# Patient Record
Sex: FEMALE | Race: BLACK OR AFRICAN AMERICAN | NOT HISPANIC OR LATINO | Employment: OTHER | URBAN - METROPOLITAN AREA
[De-identification: names, ages, dates, MRNs, and addresses within clinical notes are randomized per-mention and may not be internally consistent; named-entity substitution may affect disease eponyms.]

---

## 2022-04-11 ENCOUNTER — HISTORICAL (OUTPATIENT)
Dept: ADMINISTRATIVE | Facility: HOSPITAL | Age: 82
End: 2022-04-11

## 2022-04-28 VITALS
DIASTOLIC BLOOD PRESSURE: 85 MMHG | OXYGEN SATURATION: 98 % | HEIGHT: 61 IN | WEIGHT: 134.5 LBS | BODY MASS INDEX: 25.39 KG/M2 | SYSTOLIC BLOOD PRESSURE: 132 MMHG

## 2022-12-23 ENCOUNTER — HOSPITAL ENCOUNTER (OUTPATIENT)
Facility: HOSPITAL | Age: 82
Discharge: HOME | End: 2022-12-23
Attending: INTERNAL MEDICINE | Admitting: INTERNAL MEDICINE
Payer: MEDICARE

## 2022-12-23 ENCOUNTER — ANESTHESIA (OUTPATIENT)
Dept: ENDOSCOPY | Facility: HOSPITAL | Age: 82
End: 2022-12-23
Payer: MEDICARE

## 2022-12-23 ENCOUNTER — ANESTHESIA EVENT (OUTPATIENT)
Dept: ENDOSCOPY | Facility: HOSPITAL | Age: 82
End: 2022-12-23
Payer: MEDICARE

## 2022-12-23 VITALS
BODY MASS INDEX: 27.19 KG/M2 | DIASTOLIC BLOOD PRESSURE: 60 MMHG | RESPIRATION RATE: 12 BRPM | OXYGEN SATURATION: 99 % | TEMPERATURE: 97 F | SYSTOLIC BLOOD PRESSURE: 128 MMHG | HEIGHT: 61 IN | HEART RATE: 57 BPM | WEIGHT: 144 LBS

## 2022-12-23 DIAGNOSIS — K92.1 MELENA: ICD-10-CM

## 2022-12-23 PROCEDURE — 25000003 PHARM REV CODE 250: Performed by: NURSE ANESTHETIST, CERTIFIED REGISTERED

## 2022-12-23 PROCEDURE — 27201423 OPTIME MED/SURG SUP & DEVICES STERILE SUPPLY: Performed by: INTERNAL MEDICINE

## 2022-12-23 PROCEDURE — 63600175 PHARM REV CODE 636 W HCPCS: Performed by: NURSE ANESTHETIST, CERTIFIED REGISTERED

## 2022-12-23 PROCEDURE — 45385 COLONOSCOPY W/LESION REMOVAL: CPT | Mod: PT | Performed by: INTERNAL MEDICINE

## 2022-12-23 PROCEDURE — 37000008 HC ANESTHESIA 1ST 15 MINUTES: Performed by: INTERNAL MEDICINE

## 2022-12-23 PROCEDURE — 88305 TISSUE EXAM BY PATHOLOGIST: CPT | Performed by: INTERNAL MEDICINE

## 2022-12-23 PROCEDURE — 37000009 HC ANESTHESIA EA ADD 15 MINS: Performed by: INTERNAL MEDICINE

## 2022-12-23 RX ORDER — SODIUM CHLORIDE, SODIUM GLUCONATE, SODIUM ACETATE, POTASSIUM CHLORIDE AND MAGNESIUM CHLORIDE 30; 37; 368; 526; 502 MG/100ML; MG/100ML; MG/100ML; MG/100ML; MG/100ML
INJECTION, SOLUTION INTRAVENOUS CONTINUOUS
Status: DISCONTINUED | OUTPATIENT
Start: 2022-12-23 | End: 2022-12-23 | Stop reason: HOSPADM

## 2022-12-23 RX ORDER — AMLODIPINE BESYLATE 10 MG/1
10 TABLET ORAL DAILY
COMMUNITY

## 2022-12-23 RX ORDER — PROPOFOL 10 MG/ML
VIAL (ML) INTRAVENOUS
Status: DISPENSED
Start: 2022-12-23 | End: 2022-12-23

## 2022-12-23 RX ORDER — LIDOCAINE HYDROCHLORIDE 20 MG/ML
INJECTION, SOLUTION EPIDURAL; INFILTRATION; INTRACAUDAL; PERINEURAL
Status: DISPENSED
Start: 2022-12-23 | End: 2022-12-23

## 2022-12-23 RX ORDER — DIPHENHYDRAMINE HYDROCHLORIDE 50 MG/ML
25 INJECTION INTRAMUSCULAR; INTRAVENOUS EVERY 6 HOURS PRN
Status: CANCELLED | OUTPATIENT
Start: 2022-12-23

## 2022-12-23 RX ORDER — ALENDRONATE SODIUM 70 MG/1
70 TABLET ORAL
COMMUNITY

## 2022-12-23 RX ORDER — LEVOTHYROXINE SODIUM 25 UG/1
25 TABLET ORAL
COMMUNITY

## 2022-12-23 RX ORDER — CLONIDINE HYDROCHLORIDE 0.2 MG/1
0.2 TABLET ORAL DAILY
COMMUNITY

## 2022-12-23 RX ORDER — NEBIVOLOL 10 MG/1
10 TABLET ORAL DAILY
COMMUNITY

## 2022-12-23 RX ORDER — LIDOCAINE HYDROCHLORIDE 10 MG/ML
1 INJECTION, SOLUTION EPIDURAL; INFILTRATION; INTRACAUDAL; PERINEURAL ONCE
Status: DISCONTINUED | OUTPATIENT
Start: 2022-12-23 | End: 2022-12-23 | Stop reason: HOSPADM

## 2022-12-23 RX ORDER — LIDOCAINE HYDROCHLORIDE 20 MG/ML
INJECTION, SOLUTION EPIDURAL; INFILTRATION; INTRACAUDAL; PERINEURAL
Status: DISCONTINUED | OUTPATIENT
Start: 2022-12-23 | End: 2022-12-23

## 2022-12-23 RX ORDER — HYDROMORPHONE HYDROCHLORIDE 2 MG/ML
0.2 INJECTION, SOLUTION INTRAMUSCULAR; INTRAVENOUS; SUBCUTANEOUS EVERY 5 MIN PRN
Status: CANCELLED | OUTPATIENT
Start: 2022-12-23

## 2022-12-23 RX ORDER — ONDANSETRON 2 MG/ML
4 INJECTION INTRAMUSCULAR; INTRAVENOUS DAILY PRN
Status: CANCELLED | OUTPATIENT
Start: 2022-12-23

## 2022-12-23 RX ORDER — PROPOFOL 10 MG/ML
VIAL (ML) INTRAVENOUS CONTINUOUS PRN
Status: DISCONTINUED | OUTPATIENT
Start: 2022-12-23 | End: 2022-12-23

## 2022-12-23 RX ADMIN — LIDOCAINE HYDROCHLORIDE 50 MG: 20 INJECTION, SOLUTION INTRAVENOUS at 09:12

## 2022-12-23 RX ADMIN — SODIUM CHLORIDE, SODIUM GLUCONATE, SODIUM ACETATE, POTASSIUM CHLORIDE AND MAGNESIUM CHLORIDE: 526; 502; 368; 37; 30 INJECTION, SOLUTION INTRAVENOUS at 09:12

## 2022-12-23 RX ADMIN — PROPOFOL 120 MCG/KG/MIN: 10 INJECTION, EMULSION INTRAVENOUS at 09:12

## 2022-12-23 NOTE — ANESTHESIA PREPROCEDURE EVALUATION
12/23/2022  Deloris Rodriguez is a 82 y.o. female with melena for colonoscopy.  She has a hx of HTN and hypothyroid, but is generally quite healthy and active for her age.  She is capable of 4 mets.  She is pleasant, calm, conversant, and appropriate in holding area.        Pre-op Assessment    I have reviewed the Patient Summary Reports.     I have reviewed the Nursing Notes. I have reviewed the NPO Status.   I have reviewed the Medications.     Review of Systems  Anesthesia Hx:  No problems with previous Anesthesia  Denies Family Hx of Anesthesia complications.   Denies Personal Hx of Anesthesia complications.   Cardiovascular:   Exercise tolerance: good Hypertension    Pulmonary:  Pulmonary Normal    Endocrine:   Hypothyroidism        Physical Exam  General: Well nourished, Cooperative, Alert and Oriented    Airway:  Mallampati: II   Mouth Opening: Normal  TM Distance: Normal  Tongue: Normal  Neck ROM: Normal ROM    Chest/Lungs:  Clear to auscultation, Normal Respiratory Rate    Heart:  Rate: Normal  Rhythm: Regular Rhythm        Anesthesia Plan  Type of Anesthesia, risks & benefits discussed:    Anesthesia Type: Gen Natural Airway  Intra-op Monitoring Plan: Standard ASA Monitors  Post Op Pain Control Plan: multimodal analgesia  Induction:  IV  Informed Consent: Informed consent signed with the Patient and all parties understand the risks and agree with anesthesia plan.  All questions answered.   ASA Score: 2  Day of Surgery Review of History & Physical: H&P Update referred to the surgeon/provider.    Ready For Surgery From Anesthesia Perspective.     .

## 2022-12-23 NOTE — TRANSFER OF CARE
"Anesthesia Transfer of Care Note    Patient: Deloris Rodriguez    Procedure(s) Performed: Procedure(s) (LRB):  COLON (N/A)  COLONOSCOPY, WITH POLYPECTOMY USING SNARE    Patient location: GI    Anesthesia Type: MAC    Transport from OR: Transported from OR on room air with adequate spontaneous ventilation    Post pain: adequate analgesia    Post assessment: no apparent anesthetic complications    Post vital signs: stable    Level of consciousness: awake    Complications: none    Transfer of care protocol was followed      Last vitals:   Visit Vitals  /72   Pulse (!) 58   Temp 36.3 °C (97.3 °F)   Resp 19   Ht 5' 1" (1.549 m)   Wt 65.3 kg (144 lb)   SpO2 100%   BMI 27.21 kg/m²     "

## 2022-12-23 NOTE — PROVATION PATIENT INSTRUCTIONS
Discharge Summary/Instructions after an Endoscopic Procedure  Patient Name: Deloris Rodriguez  Patient MRN: [id number]  Patient YOB: 1940  Friday, December 23, 2022  Wicho Norris MD  Dear patient,  As a result of recent federal legislation (The Federal Cures Act), you may   receive lab or pathology results from your procedure in your [facility]   account before your physician is able to contact you. Your physician or   their representative will relay the results to you with their   recommendations at their soonest availability.  Thank you,  RESTRICTIONS:  During your procedure today, you received medications for sedation.  These   medications may affect your judgment, balance and coordination.  Therefore,   for 24 hours, you have the following restrictions:   - DO NOT drive a car, operate machinery, make legal/financial decisions,   sign important papers or drink alcohol.    ACTIVITY:  Today: no heavy lifting, straining or running due to procedural   sedation/anesthesia.  The following day: return to full activity including work.  DIET:  Eat and drink normally unless instructed otherwise.     TREATMENT FOR COMMON SIDE EFFECTS:  - Mild abdominal pain, nausea, belching, bloating or excessive gas:  rest,   eat lightly and use a heating pad.  - Sore Throat: treat with throat lozenges and/or gargle with warm salt   water.  - Because air was used during the procedure, expelling large amounts of air   from your rectum or belching is normal.  - If a bowel prep was taken, you may not have a bowel movement for 1-3 days.    This is normal.  SYMPTOMS TO WATCH FOR AND REPORT TO YOUR PHYSICIAN:  1. Abdominal pain or bloating, other than gas cramps.  2. Chest pain.  3. Back pain.  4. Signs of infection such as: chills or fever occurring within 24 hours   after the procedure.  5. Rectal bleeding, which would show as bright red, maroon, or black stools.   (A tablespoon of blood from the rectum is not serious,  especially if   hemorrhoids are present.)  6. Vomiting.  7. Weakness or dizziness.  GO DIRECTLY TO THE NEAREST EMERGENCY ROOM IF YOU HAVE ANY OF THE FOLLOWING:      Difficulty breathing              Chills and/or fever over 101 F   Persistent vomiting and/or vomiting blood   Severe abdominal pain   Severe chest pain   Black, tarry stools   Bleeding- more than one tablespoon   Any other symptom or condition that you feel may need urgent attention  Your doctor recommends these additional instructions:  If any biopsies were taken, your doctors clinic will contact you in 1 to 2   weeks with any results.  - Discharge patient to home.   - Resume previous diet.   - Continue present medications.   - Await pathology results.   - Repeat colonoscopy is not recommended due to current age (80 years or   older) not required according to current guidelines and current age.   - Return to GI office PRN.  For questions, problems or results please call your physician - Wicho Norris MD at Work:  [phone].  [facility], EMERGENCY ROOM PHONE NUMBER: [phone]  IF A COMPLICATION OR EMERGENCY SITUATION ARISES AND YOU ARE UNABLE TO REACH   YOUR PHYSICIAN - GO DIRECTLY TO THE EMERGENCY ROOM.  MD Wicho Leung MD  12/23/2022 9:39:28 AM  This report has been verified and signed electronically.  Dear patient,  As a result of recent federal legislation (The Federal Cures Act), you may   receive lab or pathology results from your procedure in your [facility]   account before your physician is able to contact you. Your physician or   their representative will relay the results to you with their   recommendations at their soonest availability.  Thank you,  PROVATION

## 2022-12-27 LAB
ESTROGEN SERPL-MCNC: NORMAL PG/ML
INSULIN SERPL-ACNC: NORMAL U[IU]/ML
LAB AP CLINICAL INFORMATION: NORMAL
LAB AP GROSS DESCRIPTION: NORMAL
LAB AP REPORT FOOTNOTES: NORMAL
T3RU NFR SERPL: NORMAL %

## 2022-12-28 NOTE — ANESTHESIA POSTPROCEDURE EVALUATION
Anesthesia Post Evaluation    Patient: Deloris Rodriguez    Procedure(s) Performed: Procedure(s) (LRB):  COLON (N/A)  COLONOSCOPY, WITH POLYPECTOMY USING SNARE    Final Anesthesia Type: general      Patient location during evaluation: PACU  Patient participation: Yes- Able to Participate  Level of consciousness: awake and alert  Post-procedure vital signs: reviewed and stable  Pain management: adequate  Airway patency: patent      Anesthetic complications: no      Cardiovascular status: blood pressure returned to baseline  Respiratory status: unassisted  Hydration status: euvolemic  Follow-up not needed.          Vitals Value Taken Time   /60 12/23/22 1001   Temp 36.1 °C (97 °F) 12/23/22 0940   Pulse 57 12/23/22 1001   Resp 12 12/23/22 1001   SpO2 99 % 12/23/22 1001         No case tracking events are documented in the log.      Pain/Malathi Score: No data recorded

## 2023-03-30 NOTE — H&P
History and Physical           HPI:     Patient is a 82 y.o. female presents for EGD to evaluate melena    PCP:  Roger Du Jr, MD    Review of patient's allergies indicates:  No Known Allergies     Past Medical History:  Past Medical History:   Diagnosis Date    Hypertension     Thyroid disease       Past Surgical History:  Past Surgical History:   Procedure Laterality Date    COLONOSCOPY N/A 12/23/2022    Procedure: COLON;  Surgeon: Wicho Norris MD;  Location: [facility] ENDOSCOPY;  Service: Gastroenterology;  Laterality: N/A;    COLONOSCOPY, WITH POLYPECTOMY USING SNARE  12/23/2022    Procedure: COLONOSCOPY, WITH POLYPECTOMY USING SNARE;  Surgeon: Wicho Norris MD;  Location: [facility] ENDOSCOPY;  Service: Gastroenterology;;      Family History:  History reviewed. No pertinent family history.  Social History:  Social History     Tobacco Use    Smoking status: Not on file    Smokeless tobacco: Not on file   Substance Use Topics    Alcohol use: Not on file         Review of Systems:     Review of Systems    Objective:     VITAL SIGNS: 24 HR MIN & MAX LAST    No data recorded  97 °F (36.1 °C)        No data recorded  128/60     No data recorded  (!) 57     No data recorded  12    No data recorded  99 %      Physical Exam      No results found for this or any previous visit (from the past 48 hour(s)).    No results found.    @[facility]@    Assessment /Plan:   Melena-proceed with EGD to rule out upper GI bleeding  There are no problems to display for this patient.       Thank you for allowing us to participate in this patient's care.

## 2024-07-23 DIAGNOSIS — R93.2 NONVISUALIZATION OF GALLBLADDER: Primary | ICD-10-CM

## 2024-11-12 ENCOUNTER — ANESTHESIA (OUTPATIENT)
Dept: ENDOSCOPY | Facility: HOSPITAL | Age: 84
End: 2024-11-12
Payer: MEDICARE

## 2024-11-12 ENCOUNTER — HOSPITAL ENCOUNTER (OUTPATIENT)
Facility: HOSPITAL | Age: 84
Discharge: HOME | End: 2024-11-12
Attending: INTERNAL MEDICINE | Admitting: INTERNAL MEDICINE
Payer: MEDICARE

## 2024-11-12 ENCOUNTER — ANESTHESIA EVENT (OUTPATIENT)
Dept: ENDOSCOPY | Facility: HOSPITAL | Age: 84
End: 2024-11-12
Payer: MEDICARE

## 2024-11-12 DIAGNOSIS — R10.13 ABDOMINAL PAIN, EPIGASTRIC: ICD-10-CM

## 2024-11-12 DIAGNOSIS — R10.11 RUQ PAIN: ICD-10-CM

## 2024-11-12 PROCEDURE — 37000008 HC ANESTHESIA 1ST 15 MINUTES: Performed by: INTERNAL MEDICINE

## 2024-11-12 PROCEDURE — 37000009 HC ANESTHESIA EA ADD 15 MINS: Performed by: INTERNAL MEDICINE

## 2024-11-12 PROCEDURE — 88313 SPECIAL STAINS GROUP 2: CPT

## 2024-11-12 PROCEDURE — 43239 EGD BIOPSY SINGLE/MULTIPLE: CPT | Performed by: INTERNAL MEDICINE

## 2024-11-12 PROCEDURE — 63600175 PHARM REV CODE 636 W HCPCS: Performed by: NURSE ANESTHETIST, CERTIFIED REGISTERED

## 2024-11-12 PROCEDURE — 88305 TISSUE EXAM BY PATHOLOGIST: CPT | Performed by: INTERNAL MEDICINE

## 2024-11-12 PROCEDURE — 25000003 PHARM REV CODE 250: Performed by: NURSE ANESTHETIST, CERTIFIED REGISTERED

## 2024-11-12 PROCEDURE — 88342 IMHCHEM/IMCYTCHM 1ST ANTB: CPT

## 2024-11-12 RX ORDER — LIDOCAINE HYDROCHLORIDE 20 MG/ML
INJECTION, SOLUTION EPIDURAL; INFILTRATION; INTRACAUDAL; PERINEURAL
Status: DISCONTINUED
Start: 2024-11-12 | End: 2024-11-12 | Stop reason: HOSPADM

## 2024-11-12 RX ORDER — ONDANSETRON HYDROCHLORIDE 2 MG/ML
INJECTION, SOLUTION INTRAVENOUS
Status: COMPLETED
Start: 2024-11-12 | End: 2024-11-12

## 2024-11-12 RX ORDER — DIPHENHYDRAMINE HYDROCHLORIDE 50 MG/ML
25 INJECTION INTRAMUSCULAR; INTRAVENOUS EVERY 6 HOURS PRN
OUTPATIENT
Start: 2024-11-12

## 2024-11-12 RX ORDER — DEXAMETHASONE SODIUM PHOSPHATE 4 MG/ML
INJECTION, SOLUTION INTRA-ARTICULAR; INTRALESIONAL; INTRAMUSCULAR; INTRAVENOUS; SOFT TISSUE
Status: COMPLETED
Start: 2024-11-12 | End: 2024-11-12

## 2024-11-12 RX ORDER — LIDOCAINE HYDROCHLORIDE 20 MG/ML
INJECTION INTRAVENOUS
Status: DISCONTINUED | OUTPATIENT
Start: 2024-11-12 | End: 2024-11-12

## 2024-11-12 RX ORDER — DEXAMETHASONE SODIUM PHOSPHATE 4 MG/ML
INJECTION, SOLUTION INTRA-ARTICULAR; INTRALESIONAL; INTRAMUSCULAR; INTRAVENOUS; SOFT TISSUE
Status: DISCONTINUED | OUTPATIENT
Start: 2024-11-12 | End: 2024-11-12

## 2024-11-12 RX ORDER — ONDANSETRON HYDROCHLORIDE 2 MG/ML
INJECTION, SOLUTION INTRAVENOUS
Status: DISCONTINUED | OUTPATIENT
Start: 2024-11-12 | End: 2024-11-12

## 2024-11-12 RX ORDER — PROPOFOL 10 MG/ML
VIAL (ML) INTRAVENOUS
Status: COMPLETED
Start: 2024-11-12 | End: 2024-11-12

## 2024-11-12 RX ORDER — HYDROMORPHONE HYDROCHLORIDE 2 MG/ML
0.2 INJECTION, SOLUTION INTRAMUSCULAR; INTRAVENOUS; SUBCUTANEOUS EVERY 5 MIN PRN
OUTPATIENT
Start: 2024-11-12

## 2024-11-12 RX ORDER — SODIUM CHLORIDE, SODIUM GLUCONATE, SODIUM ACETATE, POTASSIUM CHLORIDE AND MAGNESIUM CHLORIDE 30; 37; 368; 526; 502 MG/100ML; MG/100ML; MG/100ML; MG/100ML; MG/100ML
INJECTION, SOLUTION INTRAVENOUS CONTINUOUS
Status: DISCONTINUED | OUTPATIENT
Start: 2024-11-12 | End: 2024-11-12 | Stop reason: HOSPADM

## 2024-11-12 RX ORDER — ONDANSETRON HYDROCHLORIDE 2 MG/ML
4 INJECTION, SOLUTION INTRAVENOUS DAILY PRN
OUTPATIENT
Start: 2024-11-12

## 2024-11-12 RX ORDER — GLUCAGON 1 MG
1 KIT INJECTION
OUTPATIENT
Start: 2024-11-12

## 2024-11-12 RX ORDER — LIDOCAINE HYDROCHLORIDE 10 MG/ML
1 INJECTION, SOLUTION EPIDURAL; INFILTRATION; INTRACAUDAL; PERINEURAL ONCE
Status: DISCONTINUED | OUTPATIENT
Start: 2024-11-12 | End: 2024-11-12 | Stop reason: HOSPADM

## 2024-11-12 RX ORDER — PROPOFOL 10 MG/ML
INJECTION, EMULSION INTRAVENOUS
Status: DISCONTINUED | OUTPATIENT
Start: 2024-11-12 | End: 2024-11-12

## 2024-11-12 RX ADMIN — SODIUM CHLORIDE: 9 INJECTION, SOLUTION INTRAVENOUS at 09:11

## 2024-11-12 RX ADMIN — DEXAMETHASONE SODIUM PHOSPHATE 4 MG: 4 INJECTION, SOLUTION INTRA-ARTICULAR; INTRALESIONAL; INTRAMUSCULAR; INTRAVENOUS; SOFT TISSUE at 09:11

## 2024-11-12 RX ADMIN — PROPOFOL 70 MG: 10 INJECTION, EMULSION INTRAVENOUS at 09:11

## 2024-11-12 RX ADMIN — ONDANSETRON 4 MG: 2 INJECTION INTRAMUSCULAR; INTRAVENOUS at 09:11

## 2024-11-12 RX ADMIN — LIDOCAINE HYDROCHLORIDE 60 MG: 20 INJECTION INTRAVENOUS at 09:11

## 2024-11-12 RX ADMIN — PROPOFOL 60 MG: 10 INJECTION, EMULSION INTRAVENOUS at 10:11

## 2024-11-12 RX ADMIN — PROPOFOL 65 MCG/KG/MIN: 10 INJECTION, EMULSION INTRAVENOUS at 09:11

## 2024-11-12 NOTE — PROVATION PATIENT INSTRUCTIONS
Discharge Summary/Instructions after an Endoscopic Procedure  Patient Name: Deloris Rodriguez  Patient MRN: [id number]  Patient YOB: 1940  Tuesday, November 12, 2024  Wicho Norris MD  Dear patient,  As a result of recent federal legislation (The Federal Cures Act), you may   receive lab or pathology results from your procedure in your [facility]   account before your physician is able to contact you. Your physician or   their representative will relay the results to you with their   recommendations at their soonest availability.  Thank you,  RESTRICTIONS:  During your procedure today, you received medications for sedation.  These   medications may affect your judgment, balance and coordination.  Therefore,   for 24 hours, you have the following restrictions:   - DO NOT drive a car, operate machinery, make legal/financial decisions,   sign important papers or drink alcohol.    ACTIVITY:  Today: no heavy lifting, straining or running due to procedural   sedation/anesthesia.  The following day: return to full activity including work.  DIET:  Eat and drink normally unless instructed otherwise.     TREATMENT FOR COMMON SIDE EFFECTS:  - Mild abdominal pain, nausea, belching, bloating or excessive gas:  rest,   eat lightly and use a heating pad.  - Sore Throat: treat with throat lozenges and/or gargle with warm salt   water.  - Because air was used during the procedure, expelling large amounts of air   from your rectum or belching is normal.  - If a bowel prep was taken, you may not have a bowel movement for 1-3 days.    This is normal.  SYMPTOMS TO WATCH FOR AND REPORT TO YOUR PHYSICIAN:  1. Abdominal pain or bloating, other than gas cramps.  2. Chest pain.  3. Back pain.  4. Signs of infection such as: chills or fever occurring within 24 hours   after the procedure.  5. Rectal bleeding, which would show as bright red, maroon, or black stools.   (A tablespoon of blood from the rectum is not serious,  especially if   hemorrhoids are present.)  6. Vomiting.  7. Weakness or dizziness.  GO DIRECTLY TO THE NEAREST EMERGENCY ROOM IF YOU HAVE ANY OF THE FOLLOWING:      Difficulty breathing              Chills and/or fever over 101 F   Persistent vomiting and/or vomiting blood   Severe abdominal pain   Severe chest pain   Black, tarry stools   Bleeding- more than one tablespoon   Any other symptom or condition that you feel may need urgent attention  Your doctor recommends these additional instructions:  If any biopsies were taken, your doctors clinic will contact you in 1 to 2   weeks with any results.  - Discharge patient to home.   - Resume previous diet.   - Continue present medications.   - Await pathology results.   - Return to GI clinic in 2 months.  For questions, problems or results please call your physician - Wicho Norris MD at Work:  [phone].  [facility] ED at [phone]  IF A COMPLICATION OR EMERGENCY SITUATION ARISES AND YOU ARE UNABLE TO REACH   YOUR PHYSICIAN - GO DIRECTLY TO THE EMERGENCY ROOM.  MD Wicho Leung MD  11/12/2024 10:08:13 AM  This report has been verified and signed electronically.  Dear patient,  As a result of recent federal legislation (The Federal Cures Act), you may   receive lab or pathology results from your procedure in your [facility]   account before your physician is able to contact you. Your physician or   their representative will relay the results to you with their   recommendations at their soonest availability.  Thank you,  PROVATION

## 2024-11-12 NOTE — ANESTHESIA PREPROCEDURE EVALUATION
"                                                                                                             11/12/2024  Deloris Rodriguez is a 84 y.o. female with epigastric and RUQ abdominal pain. She has a hx of HTN and hypothyroid, but is generally quite healthy and active for her age.  She is capable of 4 mets.  She is pleasant, calm, conversant, and appropriate in holding area.       Recent family medicine note excerpt:  "Annual Wellness Exam  History of Present Illness  The patient is a 83-year-old female who presents for a wellness visit.    She reports feeling well overall, with no chest pain, shortness of breath, lightheadedness, or dizziness. She maintains an active lifestyle, exercising four days a week for approximately 40 minutes each session. Her social life is also active, with daily visits to her brother and weekly Jain attendance with family. She has recently started working at [employer].   Her sleep is generally good, although she occasionally experiences stress-related sleep disturbances. She regularly visits the dentist and always wears her seatbelt while driving. Her memory remains sharp. She has a living will and power of  in place.  Her blood pressure is well-controlled, but she sometimes experiences slight headaches. She brings her own lunch to work, which typically includes a salad, and she stays well-hydrated. She uses Celebrex as needed for aches and pains and does not require a refill at this time. She is scheduled to see Dr. Patricia next month for thyroid nodule management.     She has a cyst, and Dr. Monet reviewed the records and said it could be scar tissue, so he is going to do an EGD on 09/05/2024. "      Pre-op Assessment    I have reviewed the Patient Summary Reports.     I have reviewed the Nursing Notes. I have reviewed the NPO Status.   I have reviewed the Medications.     Review of Systems  Anesthesia Hx:  No problems with previous Anesthesia             Denies " Family Hx of Anesthesia complications.    Denies Personal Hx of Anesthesia complications.                    Cardiovascular:  Exercise tolerance: good   Hypertension                                          Pulmonary:  Pulmonary Normal                       Endocrine:   Hypothyroidism              Physical Exam  General: Well nourished, Cooperative, Alert and Oriented    Airway:  Mallampati: II   Mouth Opening: Normal  TM Distance: Normal  Tongue: Normal  Neck ROM: Normal ROM    Chest/Lungs:  Clear to auscultation, Normal Respiratory Rate    Heart:  Rate: Normal  Rhythm: Regular Rhythm        Anesthesia Plan  Type of Anesthesia, risks & benefits discussed:    Anesthesia Type: Gen Natural Airway  Intra-op Monitoring Plan: Standard ASA Monitors  Post Op Pain Control Plan: multimodal analgesia  Induction:  IV  Informed Consent: Informed consent signed with the Patient and all parties understand the risks and agree with anesthesia plan.  All questions answered.   ASA Score: 2  Day of Surgery Review of History & Physical: H&P Update referred to the surgeon/provider.    Ready For Surgery From Anesthesia Perspective.     .

## 2024-11-12 NOTE — TRANSFER OF CARE
Anesthesia Transfer of Care Note    Patient: Deloris Rodriguez    Procedure(s) Performed: Procedure(s) (LRB):  EGD (N/A)    Patient location: GI    Anesthesia Type: general and MAC    Transport from OR: Transported from OR on room air with adequate spontaneous ventilation    Post pain: adequate analgesia    Post assessment: no apparent anesthetic complications    Post vital signs: stable    Level of consciousness: awake and sedated    Nausea/Vomiting: no nausea/vomiting    Complications: none    Transfer of care protocol was followed      Last vitals: Visit Vitals  BP (!) 97/55 (BP Location: Right arm, Patient Position: Lying)   Pulse 65   Temp 36.5 °C (97.7 °F) (Skin)   Resp 14   SpO2 99%   Breastfeeding Unknown

## 2024-11-12 NOTE — H&P
[facility]  Gastroenterology  H&P    Patient Name: Deloris Rodriguez  MRN: [id number]  Admission Date: 11/12/2024  Code Status: No Order    Attending Provider: Wicho Norris MD   Primary Care Physician: Roger Du Jr., MD  Principal Problem:<principal problem not specified>    Subjective:     History of Present Illness: presents for egd to further evaluate ruq pain with or without food    Past Medical History:   Diagnosis Date    Hypertension     Thyroid disease        Past Surgical History:   Procedure Laterality Date    COLONOSCOPY N/A 12/23/2022    Procedure: COLON;  Surgeon: Wicho Norris MD;  Location: [facility] ENDOSCOPY;  Service: Gastroenterology;  Laterality: N/A;    COLONOSCOPY, WITH POLYPECTOMY USING SNARE  12/23/2022    Procedure: COLONOSCOPY, WITH POLYPECTOMY USING SNARE;  Surgeon: Wicho Norris MD;  Location: [facility] ENDOSCOPY;  Service: Gastroenterology;;       Review of patient's allergies indicates:   Allergen Reactions    Iodine Hives     No iodine IV pt states that she breaks out in hives.       Family History    None       Tobacco Use    Smoking status: Never    Smokeless tobacco: Never   Substance and Sexual Activity    Alcohol use: Never    Drug use: Never    Sexual activity: Not Currently     Review of Systems  Objective:     Vital Signs (Most Recent):  Temp: 96.8 °F (36 °C) (11/12/24 0922)  Pulse: (!) 54 (11/12/24 0922)  Resp: 16 (11/12/24 0922)  BP: (!) 125/56 (11/12/24 0923)  SpO2: 100 % (room air) (11/12/24 0922) Vital Signs (24h Range):  Temp:  [96.8 °F (36 °C)] 96.8 °F (36 °C)  Pulse:  [54] 54  Resp:  [16] 16  SpO2:  [100 %] 100 %  BP: (125)/(56) 125/56        There is no height or weight on file to calculate BMI.    No intake or output data in the 24 hours ending 11/12/24 0949    Lines/Drains/Airways       Peripheral Intravenous Line  Duration                  Peripheral IV - Single Lumen 11/12/24 0932 22 G Anterior;Left  Forearm <1 day                    Physical Exam    Significant Labs:  All pertinent lab results from the last 24 hours have been reviewed.    Significant Imaging:  Imaging results within the past 24 hours have been reviewed.    Assessment/Plan:     There are no hospital problems to display for this patient.      presents for egd to further evaluate ruq pain with or without food    Wicho Norris MD  Gastroenterology  [facility]

## 2024-11-13 VITALS
HEART RATE: 54 BPM | TEMPERATURE: 97 F | OXYGEN SATURATION: 96 % | DIASTOLIC BLOOD PRESSURE: 41 MMHG | RESPIRATION RATE: 16 BRPM | SYSTOLIC BLOOD PRESSURE: 92 MMHG

## 2024-11-13 LAB — PSYCHE PATHOLOGY RESULT: NORMAL

## 2024-11-13 NOTE — ANESTHESIA POSTPROCEDURE EVALUATION
Anesthesia Post Evaluation    Patient: Deloris Rodriguez    Procedure(s) Performed: Procedure(s) (LRB):  EGD (N/A)    Final Anesthesia Type: general      Patient location during evaluation: PACU  Patient participation: Yes- Able to Participate  Level of consciousness: awake and alert  Post-procedure vital signs: reviewed and stable  Pain management: adequate  Airway patency: patent      Anesthetic complications: no      Cardiovascular status: blood pressure returned to baseline  Respiratory status: unassisted  Hydration status: euvolemic  Follow-up not needed.              Vitals Value Taken Time   BP 92/41 11/12/24 1017   Temp 36.1 °C (97 °F) 11/12/24 1007   Pulse 54 11/12/24 1017   Resp 16 11/12/24 1017   SpO2 96 % 11/12/24 1017         No case tracking events are documented in the log.      Pain/Malathi Score: Malathi Score: 7 (11/12/2024 10:17 AM)

## 2025-03-10 ENCOUNTER — HOSPITAL ENCOUNTER (EMERGENCY)
Facility: HOSPITAL | Age: 85
Discharge: LEFT BEFORE DISCHARGE | End: 2025-03-10
Payer: MEDICARE

## 2025-03-10 VITALS
BODY MASS INDEX: 27.38 KG/M2 | WEIGHT: 145 LBS | OXYGEN SATURATION: 100 % | HEART RATE: 54 BPM | DIASTOLIC BLOOD PRESSURE: 82 MMHG | SYSTOLIC BLOOD PRESSURE: 178 MMHG | TEMPERATURE: 98 F | RESPIRATION RATE: 18 BRPM | HEIGHT: 61 IN

## 2025-03-10 DIAGNOSIS — R10.11 RUQ ABDOMINAL PAIN: ICD-10-CM

## 2025-03-10 DIAGNOSIS — R10.10 UPPER ABDOMINAL PAIN: ICD-10-CM

## 2025-03-10 PROCEDURE — 93010 ELECTROCARDIOGRAM REPORT: CPT | Mod: ,,, | Performed by: INTERNAL MEDICINE

## 2025-03-10 PROCEDURE — 93005 ELECTROCARDIOGRAM TRACING: CPT

## 2025-03-10 PROCEDURE — 99283 EMERGENCY DEPT VISIT LOW MDM: CPT | Mod: 25

## 2025-03-11 LAB
OHS QRS DURATION: 76 MS
OHS QTC CALCULATION: 424 MS

## (undated) DEVICE — CONTAINER SPECIMEN SCREW 4OZ

## (undated) DEVICE — COLLECTION SPECIMEN NEPTUNE

## (undated) DEVICE — TUBING O2 FEMALE CONN 13FT

## (undated) DEVICE — SOL IRRI STRL WATER 1000ML

## (undated) DEVICE — ADAPTER DUAL NSL LUER M-M 7FT

## (undated) DEVICE — SNARE ACUSNARE POLYPECTOMY

## (undated) DEVICE — KIT SURGICAL COLON .25 1.1OZ

## (undated) DEVICE — KIT CANIST SUCTION 1200CC

## (undated) DEVICE — BLOCK BLOX BITE DENT RIM 54FR

## (undated) DEVICE — TIP SUCTION YANKAUER

## (undated) DEVICE — BAG LABGUARD BIOHAZARD 6X9IN

## (undated) DEVICE — UNDERPAD DISPOSABLE 30X30IN